# Patient Record
(demographics unavailable — no encounter records)

---

## 2024-10-14 NOTE — HISTORY OF PRESENT ILLNESS
[FreeTextEntry6] : JERE presents today with a cough for several days. Her appetite and energy has been decreased. No other sick contacts at home.

## 2024-10-14 NOTE — DISCUSSION/SUMMARY
[FreeTextEntry1] : JERE presents today with sore throat, POCT covid and strep was negative. Will  send wait and see antibiotics until final culture results.

## 2024-10-14 NOTE — PHYSICAL EXAM
[Acute Distress] : no acute distress [Alert] : alert [Tired appearing] : tired appearing [Tenderness] : no tenderness [EOMI] : grossly EOMI [Clear] : right tympanic membrane clear [Pink Nasal Mucosa] : nasal mucosa not pink [Erythematous Oropharynx] : nonerythematous oropharynx [Clear to Auscultation Bilaterally] : clear to auscultation bilaterally [Transmitted Upper Airway Sounds] : no transmitted upper airway sounds [Subcostal Retractions] : no subcostal retractions [Regular Rate and Rhythm] : regular rate and rhythm [Soft] : soft [Tender] : nontender

## 2024-11-23 NOTE — DISCUSSION/SUMMARY
[FreeTextEntry1] : JERE presents today with [id number] and will be given Zofran for relief. She recently had a Mri done Nov 8th for back pain

## 2024-11-23 NOTE — PHYSICAL EXAM
[Acute Distress] : no acute distress [Alert] : not alert [Tired appearing] : tired appearing [EOMI] : grossly EOMI [Clear] : right tympanic membrane clear [Pink Nasal Mucosa] : nasal mucosa not pink [Erythematous Oropharynx] : nonerythematous oropharynx [Clear to Auscultation Bilaterally] : clear to auscultation bilaterally [Transmitted Upper Airway Sounds] : no transmitted upper airway sounds

## 2024-11-23 NOTE — DISCUSSION/SUMMARY
[FreeTextEntry1] : JERE presents today with [id number] and will be given Zofran for relief. She recently had a Mri done Nov 8th for back pain

## 2024-11-23 NOTE — DISCUSSION/SUMMARY
[FreeTextEntry1] : JERE presents today with [id number] and will be given Zofran for relief. She recently had a Mri done Nov 8th for back pain

## 2024-11-26 NOTE — PHYSICAL EXAM
[Alert] : alert [EOMI] : grossly EOMI [Clear] : right tympanic membrane clear [Pink Nasal Mucosa] : pink nasal mucosa [Erythematous Oropharynx] : erythematous oropharynx [Supple] : supple [FROM] : full passive range of motion [Symmetric Chest Wall] : symmetric chest wall [Clear to Auscultation Bilaterally] : clear to auscultation bilaterally [Regular Rate and Rhythm] : regular rate and rhythm [Normal S1, S2 audible] : normal S1, S2 audible [Soft] : soft [NL] : warm, clear [Acute Distress] : no acute distress [Tenderness] : no tenderness [Enlarged Tonsils] : tonsils not enlarged [Vesicles] : no vesicles [Exudate] : no exudate [Ulcerative Lesions] : no ulcerative lesions [Murmur] : no murmur [Tender] : nontender [Hepatosplenomegaly] : no hepatosplenomegaly [de-identified] : 1cm palpable tonsillar nodes bilaterally

## 2024-11-26 NOTE — DISCUSSION/SUMMARY
[FreeTextEntry1] : Supportive care measures of acute pharyngitis reviewed. Throat swab sent to lab for culture. Will F.U results.

## 2024-11-26 NOTE — HISTORY OF PRESENT ILLNESS
[de-identified] : sore throat [FreeTextEntry6] : Since this morning she has a sore throat. No fever. Slight cough. No runy nose. no one else is sick at home. It hurts to swallow. This office is her medical home.

## 2024-12-20 NOTE — RISK ASSESSMENT
[0] : 1) Little interest or pleasure doing things: Not at all (0) [1] : 2) Feeling down, depressed, or hopeless for several days (1) [PHQ-9 Negative - No further assessment needed] : PHQ-9 Negative - No further assessment needed [de-identified] : IN WEEKLY ZOOM COUNSELIN

## 2024-12-20 NOTE — DISCUSSION/SUMMARY
[Normal Growth] : growth [Normal Development] : development  [No Elimination Concerns] : elimination [Continue Regimen] : feeding [No Skin Concerns] : skin [Normal Sleep Pattern] : sleep [BMI ___] : body mass index of [unfilled] [Physical Growth and Development] : physical growth and development [Social and Academic Competence] : social and academic competence [Emotional Well-Being] : emotional well-being [Risk Reduction] : risk reduction [Violence and Injury Prevention] : violence and injury prevention [No Medications] : ~He/She~ is not on any medications [Patient] : patient [Mother] : mother [Full Activity without restrictions including Physical Education & Athletics] : Full Activity without restrictions including Physical Education & Athletics [I have examined the above-named student and completed the preparticipation physical evaluation. The athlete does not present apparent clinical contraindications to practice and participate in sport(s) as outlined above. A copy of the physical exam is on r] : I have examined the above-named student and completed the preparticipation physical evaluation. The athlete does not present apparent clinical contraindications to practice and participate in sport(s) as outlined above. A copy of the physical exam is on record in my office and can be made available to the school at the request of the parents. If conditions arise after the athlete has been cleared for participation, the physician may rescind the clearance until the problem is resolved and the potential consequences are completely explained to the athlete (and parents/guardians). [FreeTextEntry4] : DISCUSSED MRI FINDINGS, SPINE FOLLOW UP 1/7.  OVARIAN CYSTS REQUIRE NO ADDITIONAL EVAL  MONITOR FOR INFECTIONS OF URACHAL CYST [FreeTextEntry6] : MEN A FLU [FreeTextEntry7] : CHANGES PER PSYCH, GI  [FreeTextEntry1] : NONE [de-identified] : ORTHO, GI FOLLOW UP [de-identified] : YEARLY WELL [] : The components of the vaccine(s) to be administered today are listed in the plan of care. The disease(s) for which the vaccine(s) are intended to prevent and the risks have been discussed with the caretaker.  The risks are also included in the appropriate vaccination information statements which have been provided to the patient's caregiver.  The caregiver has given consent to vaccinate.

## 2024-12-20 NOTE — PHYSICAL EXAM
[No Acute Distress] : no acute distress [Normocephalic] : normocephalic [Clear tympanic membranes with bony landmarks and light reflex present bilaterally] : clear tympanic membranes with bony landmarks and light reflex present bilaterally  [Pink Nasal Mucosa] : pink nasal mucosa [Nonerythematous Oropharynx] : nonerythematous oropharynx [No Caries] : no caries [Supple, full passive range of motion] : supple, full passive range of motion [Clear to Auscultation Bilaterally] : clear to auscultation bilaterally [Regular Rate and Rhythm] : regular rate and rhythm [Normal S1, S2 audible] : normal S1, S2 audible [No Murmurs] : no murmurs [Soft] : soft [Normoactive Bowel Sounds] : normoactive bowel sounds [Raj: ____] : Raj [unfilled] [No Abnormal Lymph Nodes Palpated] : no abnormal lymph nodes palpated [Normal Muscle Tone] : normal muscle tone [Moves all extremities x 4] : moves all extremities x4 [Straight] : straight [Cranial Nerves Grossly Intact] : cranial nerves grossly intact [No Rash or Lesions] : no rash or lesions [FreeTextEntry5] : 20/40 WITHOUT GLASSES [FreeTextEntry3] : PASSED [de-identified] : TOOTH PULLED CRACKED

## 2024-12-20 NOTE — HISTORY OF PRESENT ILLNESS
[Mother] : mother [Yes] : Patient goes to dentist yearly [Toothpaste] : Primary Fluoride Source: Toothpaste [Needs Immunizations] : Needs immunizations [Heavy Bleeding] : heavy bleeding [Painful Cramps] : painful cramps [Eats meals with family] : eats meals with family [Grade: ____] : Grade: [unfilled] [Normal Performance] : normal performance [Eats regular meals including adequate fruits and vegetables] : eats regular meals including adequate fruits and vegetables [Has friends] : has friends [Has ways to cope with stress] : has ways to cope with stress [Displays self-confidence] : displays self-confidence [Gets depressed, anxious, or irritable/has mood swings] : gets depressed, anxious, or irritable/has mood swings [With Teen] : teen [With Parent/Guardian] : parent/guardian [NO] : No [No] : No cigarette smoke exposure [Irregular menses] : no irregular menses [Has concerns about body or appearance] : does not have concerns about body or appearance [Uses electronic nicotine delivery system] : does not use electronic nicotine delivery system [Uses tobacco] : does not use tobacco [Uses drugs] : does not use drugs  [Drinks alcohol] : does not drink alcohol [Has thought about hurting self or considered suicide] : has not thought about hurting self or considered suicide [FreeTextEntry7] : LAST WELL DEC 2023  SEEN BY ORTHO- DETACHED LIGAMENT RIGHT SI AVULSED  ( RECOMMENDING CRUTCHESX 3 MONTHS CONSERVATIVE TREATMENT, IF NOT SURGERY ) [facility]  INCIDENTAL URACHAL REMINANT  [de-identified] : SEVERE BACK PAIN WORSE WITH PERIODS NO BOWEL OR BLADDER INCONTINENCE [de-identified] : TOOTH PULLED  [FreeTextEntry8] : SEVERE BACK CRAMPS  [de-identified] : SONIYA WORRELL   [de-identified] : LIMITED EXERCISE WITH BACK INJURY [de-identified] : COUNSELING EVERY FRIDAY MEDS HAVE STAYED THE SAME

## 2025-01-28 NOTE — DISCUSSION/SUMMARY
[FreeTextEntry1] : Resolving Pharyngitis, strep negative. Continue sore throat supportive care. Return on Friday for preprocedural clearance.

## 2025-01-28 NOTE — REVIEW OF SYSTEMS
[Fever] : no fever [Nasal Discharge] : no nasal discharge [Nasal Congestion] : no nasal congestion [Negative] : Respiratory

## 2025-01-28 NOTE — HISTORY OF PRESENT ILLNESS
[de-identified] : Sore throat persists. No fever [FreeTextEntry6] : She tested strep negative  on the 25th. Throat still hurts. She has an upcoming Cortisone injection for her low back pain next week and will need a clearance exam but her throat is still hurting. No fever. Hurts to swallow. Sib has body aches, had a low grade fever. This office is her medical home.

## 2025-01-28 NOTE — PHYSICAL EXAM
[Acute Distress] : no acute distress [Alert] : alert [Tenderness] : no tenderness [EOMI] : grossly EOMI [Clear] : right tympanic membrane clear [Pink Nasal Mucosa] : nasal mucosa not pink [Erythematous Oropharynx] : nonerythematous oropharynx [Clear to Auscultation Bilaterally] : clear to auscultation bilaterally [Transmitted Upper Airway Sounds] : no transmitted upper airway sounds [Subcostal Retractions] : no subcostal retractions [Regular Rate and Rhythm] : regular rate and rhythm [Soft] : soft [Tender] : nontender

## 2025-01-28 NOTE — PHYSICAL EXAM
[Acute Distress] : no acute distress [Alert] : alert [Tenderness] : no tenderness [EOMI] : grossly EOMI [Clear] : right tympanic membrane clear [Pink Nasal Mucosa] : pink nasal mucosa [Supple] : supple [FROM] : full passive range of motion [Symmetric Chest Wall] : symmetric chest wall [Clear to Auscultation Bilaterally] : clear to auscultation bilaterally [NL] : regular rate and rhythm, normal S1, S2 audible, no murmurs [Hepatosplenomegaly] : no hepatosplenomegaly [FreeTextEntry1] : T97.6F [de-identified] : reddened tonsillar pillars. Tonsils are normal appearing. No exudate.

## 2025-01-28 NOTE — DISCUSSION/SUMMARY
[FreeTextEntry1] : JERE MANDEL  presents today with sore throat, POCT strep is negative. Will defer antibiotics at this time and will await for final culture results.

## 2025-01-31 NOTE — HISTORY OF PRESENT ILLNESS
[de-identified] : Fever, stuffy nose, back ache [FreeTextEntry6] : She awoke with fever to 101F. She had a stuffy nose last night. Her throat is scratchy. Her body felt hot this morning she told mom. Mom gave her two Ibuprofen tabs. No vomiting/diarrhea, no cough.  Siana her little sister was recently sick earlier this week. Also mom requests follow up ovarian sonogram as prior CT revealed small right ovarian cyst. She was scheduled for a Cortisone injection next week for her low back pain but is sick now.

## 2025-01-31 NOTE — PHYSICAL EXAM
[Alert] : alert [EOMI] : grossly EOMI [Clear] : right tympanic membrane clear [FROM] : full passive range of motion [Symmetric Chest Wall] : symmetric chest wall [Clear to Auscultation Bilaterally] : clear to auscultation bilaterally [Soft] : soft [Normal Bowel Sounds] : normal bowel sounds [NL] : no abnormal lymph nodes palpated [Acute Distress] : no acute distress [Tenderness] : no tenderness [Erythematous Oropharynx] : nonerythematous oropharynx [Enlarged Tonsils] : tonsils not enlarged [Tender] : nontender [Distended] : nondistended [Hepatosplenomegaly] : no hepatosplenomegaly [FreeTextEntry1] : T100.5F [FreeTextEntry4] : Reddened nasal mucosa with some wet opaque mucus seen within nostrils

## 2025-01-31 NOTE — DISCUSSION/SUMMARY
[FreeTextEntry1] : Recommend supportive care including antipyretics, fluids, OTC cough/cold medications if age-appropriate, and nasal saline followed by nasal suction. Return if symptoms worsen or persist. US right ovary F/U imaging ordered as per mom's request Nasal swab for viral respiratory panel obtained  Pending Cortisone injection for low back pain, may have to postpone due to febrile illness.

## 2025-01-31 NOTE — REVIEW OF SYSTEMS
[Fever] : fever [Chills] : chills [Nasal Discharge] : nasal discharge [Nasal Congestion] : nasal congestion [Negative] : Gastrointestinal [Cough] : no cough

## 2025-01-31 NOTE — CARE PLAN
[Care Plan reviewed and provided to patient/caregiver] : Care plan reviewed and provided to patient/caregiver [Understands and communicates without difficulty] : Patient/Caregiver understands and communicates without difficulty [FreeTextEntry2] : Fever management, URI supportive care, F/U treatment on chronic back pain [FreeTextEntry3] : URI supportive care symptoms such as steam treatments, mist therapy, saline nasal rinse, along with fever management, Review of Ibuprofen/Tylenol dosing schedules. Plans for F/U and treatment of chronic back pain. Nasal swab taken to attempt to identify presumed viral etiology of her symptoms, since fever just started today so if Flu we could effectively use Tamiflu. All explained to patient and mom who expressed understanding. F/U if symptoms are not resolving in expected time.

## 2025-02-14 NOTE — PHYSICAL EXAM
[General Appearance - Well Developed] : interactive [General Appearance - Well-Appearing] : well appearing [General Appearance - In No Acute Distress] : in no acute distress [Sclera] : the conjunctiva were normal [Outer Ear] : the ears and nose were normal in appearance [Examination Of The Oral Cavity] : mucous membranes were moist and pink [Normal Appearance] : was normal in appearance [Neck Supple] : was supple [Respiration, Rhythm And Depth] : normal respiratory rhythm and effort [Auscultation Breath Sounds / Voice Sounds] : clear bilateral breath sounds [Heart Rate And Rhythm] : heart rate and rhythm were normal [Heart Sounds] : normal S1 and S2 [Murmurs] : no murmurs [Bowel Sounds] : normal bowel sounds [Abdomen Soft] : soft [Abdomen Tenderness] : non-tender [Abdominal Distention] : nondistended [Musculoskeletal Exam: Normal Movement Of All Extremities] : normal movements of all extremities [Motor Tone] : muscle strength and tone were normal [No Visual Abnormalities] : no visible abnormailities [Generalized Lymph Node Enlargement] : no lymphadenopathy [Skin Color & Pigmentation] : normal skin color and pigmentation [] : no significant rash [Skin Lesions] : no skin lesions [Initial Inspection: Infant Active And Alert] : active and alert [Enlarged Diffusely] : was not enlarged [FreeTextEntry1] : deferred

## 2025-02-14 NOTE — HISTORY OF PRESENT ILLNESS
[FreeTextEntry2] : 2/18/25 [de-identified] : Dr. Arya Damon [facility] [FreeTextEntry1] : Here for preprocedurtal clearance. She has chronic low back pain on the right. [facility] goes to [facility] pain and they are injecting Cortisone mom thinks. She has had prior anesthesia for nasal septal plasy along with some endoscopies and one colonoscopy and has never had an adverse or allergic anesthesia reaction. She has no known bleeding/clotting disorders. Negative family history for anesthesia adverse or allergic reactions and negative for clotting/bleeding disorders. She is in good health. She has had this low back pain since May 2024. 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

## 2025-03-25 NOTE — DISCUSSION/SUMMARY
[FreeTextEntry1] : Rightr upper abdominal pain, unknown etiology with PMHx Peptic ulcer so recommending Omeprazole 20mg PO Daily for 14 days. F/U with Gastolenterolgy. DUB being treated with OBC pills, just recently started. F/U with GYN. Hip/low back pain resolved for now. F/U as needed.

## 2025-03-25 NOTE — REVIEW OF SYSTEMS
[Appetite Changes] : appetite changes [Vomiting] : vomiting [Abdominal Pain] : abdominal pain [Negative] : Respiratory [PO Intolerance] : PO tolerance [Diarrhea] : no diarrhea [Constipation] : no constipation [Gaseous] : not gaseous

## 2025-03-25 NOTE — PHYSICAL EXAM
[Alert] : alert [EOMI] : grossly EOMI [Clear] : left tympanic membrane clear [Pink Nasal Mucosa] : pink nasal mucosa [Supple] : supple [FROM] : full passive range of motion [Symmetric Chest Wall] : symmetric chest wall [Clear to Auscultation Bilaterally] : clear to auscultation bilaterally [Regular Rate and Rhythm] : regular rate and rhythm [Normal S1, S2 audible] : normal S1, S2 audible [Soft] : soft [Normal Bowel Sounds] : normal bowel sounds [NL] : warm, clear [Acute Distress] : no acute distress [Tenderness] : no tenderness [Erythematous Oropharynx] : nonerythematous oropharynx [Enlarged Tonsils] : tonsils not enlarged [Murmur] : no murmur [Tender] : nontender [Distended] : nondistended [Hepatosplenomegaly] : no hepatosplenomegaly [Mass] : no mass palpable [FreeTextEntry1] : T98.7F [FreeTextEntry9] : Nontender to deep palpation and to percussion. Able to jump up and down on each leg without pain.

## 2025-03-25 NOTE — PHYSICAL EXAM
[Alert] : alert [EOMI] : grossly EOMI [Clear] : right tympanic membrane clear [Pink Nasal Mucosa] : pink nasal mucosa [Supple] : supple [FROM] : full passive range of motion [Symmetric Chest Wall] : symmetric chest wall [Clear to Auscultation Bilaterally] : clear to auscultation bilaterally [Regular Rate and Rhythm] : regular rate and rhythm [Normal S1, S2 audible] : normal S1, S2 audible [Soft] : soft [Normal Bowel Sounds] : normal bowel sounds [NL] : warm, clear [Acute Distress] : no acute distress [Tenderness] : no tenderness [Erythematous Oropharynx] : nonerythematous oropharynx [Enlarged Tonsils] : tonsils not enlarged [Murmur] : no murmur [Tender] : nontender [Distended] : nondistended [Hepatosplenomegaly] : no hepatosplenomegaly [Mass] : no mass palpable [FreeTextEntry1] : T98.7F [FreeTextEntry9] : Nontender to deep palpation and to percussion. Able to jump up and down on each leg without pain.

## 2025-03-25 NOTE — HISTORY OF PRESENT ILLNESS
[de-identified] : F/U on ER visit for right upper abdominal pain [FreeTextEntry6] : Seen in [facility] ER for right upper abdominal pain she comes for F/U. Pain is now less. Sonogram of ovaries according to patient and mom was normal. Bloodwork done, reports not available. She had vomited twice, not since, and is eating some, getting hungry. No diarrhea. No urinary symptoms.  PMX of Pancreatitis and also Peptic ulcer. She had a Cortisone injection which eliminated her lower back pain. She recently has begun oral birth control to lessen her menstrual pain symptoms.

## 2025-03-25 NOTE — HISTORY OF PRESENT ILLNESS
[de-identified] : F/U on ER visit for right upper abdominal pain Refer to the Assessment tab to view/cancel completed assessment. [FreeTextEntry6] : Seen in [facility] ER for right upper abdominal pain she comes for F/U. Pain is now less. Sonogram of ovaries according to patient and mom was normal. Bloodwork done, reports not available. She had vomited twice, not since, and is eating some, getting hungry. No diarrhea. No urinary symptoms.  PMX of Pancreatitis and also Peptic ulcer. She had a Cortisone injection which eliminated her lower back pain. She recently has begun oral birth control to lessen her menstrual pain symptoms.

## 2025-04-07 NOTE — PHYSICAL EXAM
[Well Developed] : well developed [Well Nourished] : well nourished [NAD] : in no acute distress [Alert and Active] : alert and active [PERRL] : pupils were equal, round, reactive to light  [EOMI] : ~T the extraocular movements were normal and intact [No Palpable Thyroid] : no palpable thyroid [Moist & Pink Mucous Membranes] : moist and pink mucous membranes [Normal Oropharynx] : the oropharynx was normal [CTAB] : lungs clear to auscultation bilaterally [Regular Rate and Rhythm] : regular rate and rhythm [Normal S1, S2] : normal S1 and S2 [Soft] : soft  [Normal Bowel Sounds] : normal bowel sounds [No HSM] : no hepatosplenomegaly appreciated [Rectal Exam Deferred] : rectal exam was deferred [Normal Tone] : normal tone [Well-Perfused] : well-perfused [Normal Capillary Refill] : normal capillary refill  [Interactive] : interactive [Appropriate Affect] : appropriate affect [Appropriate Behavior] : appropriate behavior [Pallor] : no pallor [icteric] : anicteric [Oral Ulcers] : no oral ulcers [Respiratory Distress] : no respiratory distress  [Wheeze] : no wheezing  [Murmur] : no murmur [Distended] : non distended [Tender] : non tender [Rebound] : no rebound tenderness [Guarding] : no guarding [Lymphadenopathy] : no lymphadenopathy  [Joint Swelling] : no joint swelling [Joint Tenderness] : no joint tenderness [Focal Deficits] : no focal deficits [Verbal] : non verbal [Edema] : no edema [Cyanosis] : no cyanosis [Rash] : no rash [Eczema] : no eczema [Dry Skin] : no dry skin [Jaundice] : no jaundice [FreeTextEntry1] : Talkative, happy teen.  Very comfortable in office [de-identified] : Ramos's sign negative

## 2025-04-07 NOTE — CONSULT LETTER
[Dear  ___] : Dear  [unfilled], [Consult Letter:] : I had the pleasure of evaluating your patient, [unfilled]. [Please see my note below.] : Please see my note below. [Consult Closing:] : Thank you very much for allowing me to participate in the care of this patient.  If you have any questions, please do not hesitate to contact me. [Sincerely,] : Sincerely, [FreeTextEntry3] : Brigitte An [facility]\par  Pediatric Gastroenterology, Liver Disease and Nutrition\par  [facility]\par

## 2025-04-07 NOTE — ASSESSMENT
[Educated Patient & Family about Diagnosis] : educated the patient and family about the diagnosis [FreeTextEntry1] : 15 y/o F with several year history of abdominal pain and history of gastric ulcers seen on EGD in 2022, with biopsy suggesting reactive gastropathy, on high dose PPI for 6 weeks - follow up EGD and colonoscopy, abdominal MRE and screening blood work in January 2023 essentially normal. Symptomatic again in August 2024. Blood work (lipase, CBC, CMP) and abdominal US in ER just prior to visit unremarkable.  Recommended empiric trial of PPI. Repeat EGD if symptoms persist. Follow up 1 year later with persistent symptoms. s/p steroid injection for detached sacroiliac ligament as per mom with fairly frequent NSAID use for symptoms prior to injection. Recent ER workup including CBC, CMP, pelvic ultrasound unremarkable. Amelia is gaining weight well. No symptoms to suggest inflammatory process.  PLAN: -Discontinue PPI -Lipase, celiac panel -EGD -follow up office visit in 4-6 weeks- if symptoms persist or worsen mom to call sooner

## 2025-04-07 NOTE — HISTORY OF PRESENT ILLNESS
[de-identified] : 15 y/o F here for follow up evaluation of several month history of abdominal pain.  Mauro was initially seen by outside GI provider last in April 2022 for abdominal pain. 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 Admitted to [facility] for dehydration and coffee ground emesis, received IVF and was DC'd home on Pepcid.   Mauro was initially seen by [facility] October 2022. EGD November 2022 c/w gastric ulcers seen and biopsy suggesting reactive gastropathy with superimposed erosive process, in setting of recent NSAID use for musculoskeletal injury. Started on Omeprazole 40mg BID. Discontinued NSAIDs. Follow up EGD and colonoscopy in January 2023 normal.   Screening blood work including amylase levels January 2023 essentially normal.    Shirley was last seen in GI office in August 2024. Had felt well since previous visit until few weeks prior to this appointment. Blood work (lipase, CBC, CMP) and abdominal US in ER just prior to visit unremarkable. Recommended empiric trial of PPI. Repeat EGD if symptoms persist.   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 She had been taking NSAIDS for symptoms a few times a week for few months prior to injection.   ER visit ~ 2 weeks ago for abdominal pain at [facility]. She had blood work and pelvic sonogram done which were unremarkable.  Was seen by PMD next day who suggested consistent use of PPI. Seen in ER at [facility] few days later-  CBC, CMP unremarkable. Recommended consistent use of PPI.   Mauro comes in today for follow up visit. She reports left sided abdominal pain since her last visit ~ 1 year ago. It has progressively becomes more frequent. It lasts for several hours. There is associated nausea, only occasional emesis, NB/NB. Pain is random in timing. No reflux, no dysphagia. Appetite is unchanged, no weight loss. No obvious association with specific foods or meals. She has tried Omeprazole OTC 20 mg. initially PRN, since ER more consistently- It has helped, but has not resolved the symptoms.   BM's are daily, #3-4 on the [place] scale. No straining, no gross blood.   Mauro started OCP ~ 1 month ago for menorrhagia- started on Odalis- which may have exacerbated her abdominal pain and nausea. OCP discontinued.   27 abscences for back pain prior to steroid injection. Since injection she seems to be attending more regularly.   No recent illnesses.          [de-identified] : 1/4/2023  CMP, CBC, ESR, CRP, lipase, amylase all WNL [de-identified] : 10/7 ABAD MULTANI [de-identified] : 10/7 Abd US WNL [de-identified] : Findings: \par  Esophagus Mucosa Possible inlet patch. Otherwise normal mucosa was noted in the \par  whole esophagus. Multiple cold forceps biopsies were performed for histology. \par  Stomach Mucosa Multiple ulcers in the antrum of varying size. Normal mucosa was \par  noted in the stomach body and fundus. On retroflexed view, the stomach appeared \par  to be normal. Multiple cold forceps biopsies were performed for histology. \par  Duodenum Mucosa Erosion in duodenal bulb. Normal mucosa was noted in the second \par  part of the duodenum. Multiple cold forceps biopsies were performed for \par  histology. \par  Impressions: \par  Normal mucosa in the esophagus. (Biopsy). \par  Multiple ulcers in the antrum of varying size. Normal mucosa was noted in the \par  stomach body and fundus. (Biopsy). \par  Normal mucosa in the duodenal bulb and second part of the duodenum. (Biopsy).  [de-identified] : Biopsy:\par  Duodenum and bulb without significant findings. Distal and proximal esophagus without significant findings. One specimen of gastric antral/cardiac mucosa without significant findings. Separately gastric antral mucosa with features suggesting a reactive gastropathy including slight foveolar hyperplasia, epithelial mucin depletion, and smooth muscles fibers throughout lamina propria with a superimposed erosive process with relatively little inflammation. Warthin Starry stain negative. Reactive gastropathy can arise in setting of biliary reflux or NSAID ingestion, among other etiologies.

## 2025-04-07 NOTE — REVIEW OF SYSTEMS
[Anxious] : anxious [Negative] : Skin [Immunizations are up to date] : Immunizations are up to date [Fever] : no fever [Fatigue] : no fatigue [Pallor] : ~T no ~M pallor [Redness] : no redness [Icterus] : no icterus [Nasal Discharge] : no nasal discharge [Oral Ulcer] : no oral ulcer [Congestion] : no congestion [Shortness Of Breath] : no shortness of breath [Cough] : no cough [Asthma] : no asthma [Wheezing] : no wheezing [Murmur] : no murmur [Chest Pain] : no chest pain [Edema] : no edema [Tachycardia] : no tachycardia [Joint Pain] : no joint pain [Decreased Urination] : no decreased urination [Headache] : no headache [Weakness] : no weakness [Bleeding] : no bleeding [Anemia] : no anemia [Bruising] : no bruising [Rash] : no rash [Eczema] : no eczema

## 2025-04-07 NOTE — REASON FOR VISIT
[Consultation Follow Up] : a consultation follow up  [Patient] : patient [Mother] : mother [Medical Records] : medical records [Post Hospitalization Consult] : a post hospitalization consult

## 2025-05-27 NOTE — HISTORY OF PRESENT ILLNESS
[de-identified] : 15 y/o F here for follow up evaluation of several month history of abdominal pain.  Mauro was initially seen by outside GI provider last in April 2022 for abdominal pain. 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 Admitted to [facility] for dehydration and coffee ground emesis, received IVF and was DC'd home on Pepcid.   Mauro was initially seen by [facility] October 2022. EGD November 2022 c/w gastric ulcers seen and biopsy suggesting reactive gastropathy with superimposed erosive process, in setting of recent NSAID use for musculoskeletal injury. Started on Omeprazole 40mg BID. Discontinued NSAIDs. Follow up EGD and colonoscopy in January 2023 normal.   Screening blood work including amylase levels January 2023 essentially normal.    Shirley was last seen in GI office in August 2024.  Recommended empiric trial of PPI. Repeat EGD if symptoms persist. Follow up 1 year later with persistent symptoms. s/p steroid injection for detached sacroiliac ligament as per mom with fairly frequent NSAID use for symptoms prior to injection. Recent ER workup including CBC, CMP, pelvic ultrasound unremarkable. Mauro is gaining weight well. No symptoms to suggest inflammatory process. Discontinue PPI. Lipase, celiac panel. EGD.  May 2025 EGD unremarkable.      Mauro comes in today for follow up visit. Mom restarted PPI after EGD because she wasnt feeling well- she reports that it has been giving her some relief and symptoms are significantly improved (was previously on empiric trial of PPI without improvement). She continues to have nausea, about once a week, lasting ~ 30 minutes. It is random in timing. No vomiting. No c/o abdominal pain. Appetite is good, decent variety in diet. She drinks mostly water, soda, juice. Weight stable.   BM's are usually QD, soft, no issues.   No recent illnesses. School attendance is improved. She is competing in [employer] pageant in September.   No recent illnesses.          [de-identified] : 1/4/2023  CMP, CBC, ESR, CRP, lipase, amylase all WNL [de-identified] : 10/7 ABAD MULTANI [de-identified] : 10/7 Abd US WNL [de-identified] : Findings: \par  Esophagus Mucosa Possible inlet patch. Otherwise normal mucosa was noted in the \par  whole esophagus. Multiple cold forceps biopsies were performed for histology. \par  Stomach Mucosa Multiple ulcers in the antrum of varying size. Normal mucosa was \par  noted in the stomach body and fundus. On retroflexed view, the stomach appeared \par  to be normal. Multiple cold forceps biopsies were performed for histology. \par  Duodenum Mucosa Erosion in duodenal bulb. Normal mucosa was noted in the second \par  part of the duodenum. Multiple cold forceps biopsies were performed for \par  histology. \par  Impressions: \par  Normal mucosa in the esophagus. (Biopsy). \par  Multiple ulcers in the antrum of varying size. Normal mucosa was noted in the \par  stomach body and fundus. (Biopsy). \par  Normal mucosa in the duodenal bulb and second part of the duodenum. (Biopsy).  [de-identified] : Biopsy:\par  Duodenum and bulb without significant findings. Distal and proximal esophagus without significant findings. One specimen of gastric antral/cardiac mucosa without significant findings. Separately gastric antral mucosa with features suggesting a reactive gastropathy including slight foveolar hyperplasia, epithelial mucin depletion, and smooth muscles fibers throughout lamina propria with a superimposed erosive process with relatively little inflammation. Warthin Starry stain negative. Reactive gastropathy can arise in setting of biliary reflux or NSAID ingestion, among other etiologies.

## 2025-05-27 NOTE — ASSESSMENT
[Educated Patient & Family about Diagnosis] : educated the patient and family about the diagnosis [FreeTextEntry1] : 15 y/o F with several year history of abdominal pain and history of gastric ulcers seen on EGD in 2022, with biopsy suggesting reactive gastropathy, on high dose PPI for 6 weeks - follow up EGD and colonoscopy, abdominal MRE and screening blood work in January 2023 essentially normal. Symptomatic again in August 2024. Blood work (lipase, CBC, CMP) and abdominal US in ER just prior to visit unremarkable. S/p steroid injections for detached sacroiliac ligament as per mom with fairly frequent NSAID use for symptoms prior to injection. April 2025 ER workup including CBC, CMP, pelvic ultrasound unremarkable.   Repeat EGD May 2025 unremarkable.  Patient self restarted PPI with significant improvement in symptoms.   Symptoms most likely functional in nature. Recommended discontinuing PPI after school year. Symptoms may be improved during summer months with less structure/stress. If symptoms worsen/return consider IB Guard, Dietary restriction of sugar alcohols, carbonation, caffeine, lactose restriction. Consider periactin vs. SSRI.  PLAN: -Discontinue PPI -follow up office visit in 4-6 weeks- if symptoms persist or worsen mom to call sooner

## 2025-05-27 NOTE — CONSULT LETTER
[Dear  ___] : Dear  [unfilled], [Consult Letter:] : I had the pleasure of evaluating your patient, [unfilled]. [Please see my note below.] : Please see my note below. [Consult Closing:] : Thank you very much for allowing me to participate in the care of this patient.  If you have any questions, please do not hesitate to contact me. [Sincerely,] : Sincerely, [FreeTextEntry3] : Brigitte An [facility]\par  Pediatric Gastroenterology, Liver Disease and Nutrition\par  [facility]\par

## 2025-05-27 NOTE — PHYSICAL EXAM
[Well Developed] : well developed [Well Nourished] : well nourished [NAD] : in no acute distress [Alert and Active] : alert and active [PERRL] : pupils were equal, round, reactive to light  [EOMI] : ~T the extraocular movements were normal and intact [No Palpable Thyroid] : no palpable thyroid [Moist & Pink Mucous Membranes] : moist and pink mucous membranes [Normal Oropharynx] : the oropharynx was normal [CTAB] : lungs clear to auscultation bilaterally [Regular Rate and Rhythm] : regular rate and rhythm [Normal S1, S2] : normal S1 and S2 [Soft] : soft  [Normal Bowel Sounds] : normal bowel sounds [No HSM] : no hepatosplenomegaly appreciated [Rectal Exam Deferred] : rectal exam was deferred [Normal Tone] : normal tone [Well-Perfused] : well-perfused [Normal Capillary Refill] : normal capillary refill  [Interactive] : interactive [Appropriate Affect] : appropriate affect [Appropriate Behavior] : appropriate behavior [Pallor] : no pallor [icteric] : anicteric [Oral Ulcers] : no oral ulcers [Respiratory Distress] : no respiratory distress  [Wheeze] : no wheezing  [Murmur] : no murmur [Distended] : non distended [Tender] : non tender [Rebound] : no rebound tenderness [Guarding] : no guarding [Lymphadenopathy] : no lymphadenopathy  [Joint Swelling] : no joint swelling [Joint Tenderness] : no joint tenderness [Focal Deficits] : no focal deficits [Verbal] : non verbal [Edema] : no edema [Cyanosis] : no cyanosis [Rash] : no rash [Eczema] : no eczema [Dry Skin] : no dry skin [Jaundice] : no jaundice [FreeTextEntry1] : Talkative, happy teen.  Very comfortable in office [de-identified] : Ramos's sign negative

## 2025-06-27 NOTE — PHYSICAL EXAM
[Alert] : alert [EOMI] : grossly EOMI [Clear] : right tympanic membrane clear [Pink Nasal Mucosa] : pink nasal mucosa [Erythematous Oropharynx] : erythematous oropharynx [Supple] : supple [FROM] : full passive range of motion [Symmetric Chest Wall] : symmetric chest wall [Clear to Auscultation Bilaterally] : clear to auscultation bilaterally [Acute Distress] : no acute distress [Tenderness] : no tenderness [Vesicles] : no vesicles [Exudate] : no exudate [Ulcerative Lesions] : no ulcerative lesions [Hepatosplenomegaly] : no hepatosplenomegaly [NL] : warm, clear [FreeTextEntry1] : [id number]

## 2025-06-27 NOTE — HISTORY OF PRESENT ILLNESS
[de-identified] : Sore throat and fever to 102F [FreeTextEntry6] : Fever and sore throat as of yesterday. She is able to eat but it hurts to swallow. No one else is sick at home.  This office is her medical home.

## 2025-06-27 NOTE — DISCUSSION/SUMMARY
[FreeTextEntry1] : Acute pharyngitis, rapid strep test negative so sending throat culture specimen to lab. Warm salt water gargles, lozenges, PO clear fluids, cloraseptic spray as needed. F/U throat culture report. Recheck if not better in 7 days.